# Patient Record
Sex: FEMALE | URBAN - METROPOLITAN AREA
[De-identification: names, ages, dates, MRNs, and addresses within clinical notes are randomized per-mention and may not be internally consistent; named-entity substitution may affect disease eponyms.]

---

## 2022-08-04 ENCOUNTER — TELEPHONE (OUTPATIENT)
Dept: ENDOCRINOLOGY | Age: 42
End: 2022-08-04

## 2022-08-04 NOTE — TELEPHONE ENCOUNTER
Fax from, Primary health sending referral to see Dr. Mahad King     Dx- Type 2 DM       Referring Juvenal Jones APRN-CNP